# Patient Record
(demographics unavailable — no encounter records)

---

## 2025-06-24 NOTE — HISTORY OF PRESENT ILLNESS
[FreeTextEntry1] :   CC: Renal Mass, elevated PSA   Three weeks ago developed intense abdominal pain went to ED in [place] Had CT but unclear if renal mass seen on that scan  Followed up with GI and was ordered for MRI   MRI shows 1.3 x 1.1 x 1.3 cm complex mass with enhancing internal thin separation and probable nodularity inferiorly.  No retroperitoneal lymphadenopathy.  Cystic pancreatic lesions along the pancreatic neck- will be getting dedicated imaging for this.  Following with his GI for this.   PSA is 3.55 ng/mL, ~2 weeks ago. Prior PSA was 4.17 last year.  No urinary complaints at this time   Surgical Hx: back surgery, no abdominal surgery Social Hx: nonsmoker, no ETOH, works in finance  Family Hx: maternal grandfather-prostate cancer, and paternal uncle

## 2025-06-24 NOTE — ASSESSMENT
[FreeTextEntry1] : Diagnosis: Renal Mass, Elevated PSA  Plan:  MRI images reviewed and interpreted, small partially cystic mass with nodularity.  With current size would recommend surveillance repeat imaging ( MRI) in 6 months and then follow up after. PSA 3.55 ng/mL and with family history would recommend prostate MRI for evaluation now.   RTC in 6 months with MRI abdomen prior to visit     Ham Castellanos MD, FACS, FRCS  of Urology [facility] Director of Laparoscopic and Robotic Surgery [facility] Director of Urology, [facility] Professor of Urology   (Office) [phone] (Cell)  [phone] [email or web address]      I, Dr. Castellanos, personally performed the evaluation and management (E/M) services for this new patient. That E/M includes conducting the clinically appropriate initial history &/or exam, assessing all conditions, and establishing the plan of care. Today, my RENUKA, Celeste Allen, was here to observe my evaluation and management service for this patient & follow plan of care established by me going forward.